# Patient Record
Sex: MALE | Race: WHITE | NOT HISPANIC OR LATINO
[De-identification: names, ages, dates, MRNs, and addresses within clinical notes are randomized per-mention and may not be internally consistent; named-entity substitution may affect disease eponyms.]

---

## 2022-04-11 ENCOUNTER — APPOINTMENT (OUTPATIENT)
Dept: ORTHOPEDIC SURGERY | Facility: AMBULATORY SURGERY CENTER | Age: 67
End: 2022-04-11
Payer: COMMERCIAL

## 2022-04-11 PROCEDURE — 64718 REVISE ULNAR NERVE AT ELBOW: CPT

## 2022-04-11 PROCEDURE — ZZZZZ: CPT | Mod: 1L

## 2022-04-11 PROCEDURE — 29105 APPLICATION LONG ARM SPLINT: CPT | Mod: LT

## 2022-04-13 ENCOUNTER — APPOINTMENT (OUTPATIENT)
Dept: ORTHOPEDIC SURGERY | Facility: CLINIC | Age: 67
End: 2022-04-13
Payer: COMMERCIAL

## 2022-04-13 VITALS — WEIGHT: 268 LBS | BODY MASS INDEX: 36.3 KG/M2 | HEIGHT: 72 IN

## 2022-04-13 DIAGNOSIS — Z78.9 OTHER SPECIFIED HEALTH STATUS: ICD-10-CM

## 2022-04-13 PROCEDURE — 99024 POSTOP FOLLOW-UP VISIT: CPT

## 2022-04-13 NOTE — PHYSICAL EXAM
[Left] : left elbow [] : light touch intact [FreeTextEntry3] : Left elbow incision c/d/i. No erythema/warmth/drainage. No ulceration noted. Surgical dressing intact. [FreeTextEntry9] : Not assessed due to recent sx. [de-identified] : Not assessed due to recent sx.

## 2022-04-13 NOTE — HISTORY OF PRESENT ILLNESS
[de-identified] : PATIENT IS S/P LEFT ELBOW SURGERY WITH DR. YOUNG 4/11/22.  PATIENT WAS TAKING A SHOWER 4/12/22. HE DOES NOT BELIEVE HE GOT HIS SPLINT WET, HOWEVER STATES HE FELT IT SHIFT AND NOW FEELS AS THOUGH THERE IS A PRESSURE ULCER FORMING ALONG HIS ELBOW. DENIES FEVERS/CHILLS. PAIN IS MINIMAL POST-OP.  [] : Patient is currently injured and not playing sports: no [FreeTextEntry1] : RT ELBOW [FreeTextEntry5] : PT HERE TO HAVE SPLINT REPLACED (BROKEN) AND WRAP REPLACED. [de-identified] : 4/11/22 [de-identified] : HECTOR [de-identified] : 4/11/22

## 2022-04-13 NOTE — ASSESSMENT
[FreeTextEntry1] : Patient presents today for wound check. He is s/p left elbow surgery with Dr. Fabian on 4/11/22. He states his splint was sliding and irritating his incision, patient thought he developed a pressure ulcer. I removed the ACE wrap/splint/webril to evaluate wound/elbow. Splint was moist and broken in half. Left elbow incision c/d/i. No erythema/warmth/drainage. No ulceration noted. Surgical dressing intact. I re-applied posterior mold and placed patient back into his sling. Will f/u as scheduled with Dr. Fabian. Advised him not to remove or get his splint wet. All questions were answered. Denies fevers/chills. Pain controlled.

## 2022-04-19 ENCOUNTER — APPOINTMENT (OUTPATIENT)
Dept: ORTHOPEDIC SURGERY | Facility: CLINIC | Age: 67
End: 2022-04-19
Payer: COMMERCIAL

## 2022-04-19 VITALS — BODY MASS INDEX: 36.3 KG/M2 | WEIGHT: 268 LBS | HEIGHT: 72 IN

## 2022-04-19 VITALS — HEIGHT: 72 IN | BODY MASS INDEX: 36.3 KG/M2 | WEIGHT: 268 LBS

## 2022-04-19 PROCEDURE — 99024 POSTOP FOLLOW-UP VISIT: CPT

## 2022-04-19 PROCEDURE — 20610 DRAIN/INJ JOINT/BURSA W/O US: CPT | Mod: LT,58

## 2022-04-19 NOTE — PROCEDURE
[Large Joint Injection] : Large joint injection [Left] : of the left [Shoulder] : shoulder [Pain] : pain [Inflammation] : inflammation [X-ray evidence of Osteoarthritis on this or prior visit] : x-ray evidence of Osteoarthritis on this or prior visit [Alcohol] : alcohol [Betadine] : betadine [Sterile technique used] : sterile technique used [Ethyl Chloride sprayed topically] : ethyl chloride sprayed topically [___ cc    3mg] :  Betamethasone (Celestone) ~Vcc of 3mg [___ cc    1%] : Lidocaine ~Vcc of 1%  [___ cc    0.25%] : Bupivacaine (Marcaine) ~Vcc of 0.25%  [] : Patient tolerated procedure well [Call if redness, pain or fever occur] : call if redness, pain or fever occur [Apply ice for 15min out of every hour for the next 12-24 hours as tolerated] : apply ice for 15 minutes out of every hour for the next 12-24 hours as tolerated [Patient was advised to rest the joint(s) for ____ days] : patient was advised to rest the joint(s) for [unfilled] days [Previous OTC use and PT nontherapeutic] : patient has tried OTC's including aspirin, Ibuprofen, Aleve, etc or prescription NSAIDS, and/or exercises at home and/or physical therapy without satisfactory response [Risks, benefits, alternatives discussed / Verbal consent obtained] : the risks benefits, and alternatives have been discussed, and verbal consent was obtained

## 2022-04-19 NOTE — HISTORY OF PRESENT ILLNESS
[Gradual] : gradual [9] : 9 [6] : 6 [Localized] : localized [Sharp] : sharp [Throbbing] : throbbing [de-identified] : 65yo M with left shoulder pain that has been worsening over the past few months with no injury. Recently had elbow surgery with Dr. Fabian on the L arm. He states it is bothersome when he has to lay down a certain way at night. He has done CSI of this shoulder in 11/2021 to much relief.  [] : no [FreeTextEntry1] : lt shoulder  [FreeTextEntry5] : pt states no injury has occurred, he states he saw Dr. Young today 4/19/22 for a follow up of the lt elbow, but he also has pain on his lt shoulder. pt forgot to mention to the doctor that he wanted a cortisone injection(HE HAS BEEN TREATED FOR THE SHOULDER WITH DR YOUNG IN THE PAST)  and the call center told the pt to come to [facility] for the injection  [FreeTextEntry9] : cortisone injection  [de-identified] : activity  [de-identified] : xray ocoa [de-identified] : oz

## 2022-05-31 ENCOUNTER — APPOINTMENT (OUTPATIENT)
Dept: ORTHOPEDIC SURGERY | Facility: CLINIC | Age: 67
End: 2022-05-31
Payer: COMMERCIAL

## 2022-05-31 PROCEDURE — 99024 POSTOP FOLLOW-UP VISIT: CPT

## 2022-05-31 NOTE — HISTORY OF PRESENT ILLNESS
[5] : 5 [Dull/Aching] : dull/aching [Tingling] : tingling [Constant] : constant [Full time] : Work status: full time [FreeTextEntry6] : TINGLING IN THE FINGERS [de-identified] : 4/13/22 [de-identified] : [employer] [4] : 4 [3] : 3 [] : Post Surgical Visit: yes [FreeTextEntry1] : left elbow [de-identified] : 4/11/22 [de-identified] : LEFT ULNAR NERVE DECOMPRESSION IN SITU

## 2022-05-31 NOTE — PHYSICAL EXAM
[Normal Coordination] : normal coordination [Normal DTR UE/LE] : normal DTR UE/LE  [Normal Sensation] : normal sensation [Normal Mood and Affect] : normal mood and affect [Orientated] : orientated [Normal Skin] : normal skin [No Rash] : no rash [No Ulcers] : no ulcers [No Lesions] : no lesions [No obvious lymphadenopathy in areas examined] : no obvious lymphadenopathy in areas examined [Left] : left elbow [] : no sero-sanguinous drainage

## 2022-05-31 NOTE — ASSESSMENT
[FreeTextEntry1] : PATIENT STATES HIS NUMBNESS AND PARASTHESIAS ARE IMPROVED SINCE PRE-SURGERY IN HIS RING AND SMALL FINGERS.

## 2022-05-31 NOTE — DISCUSSION/SUMMARY
[de-identified] : Assessment & Plan: The patient is approximately 2 weeks s/p [].Sutures removed and Steri Strips applied today. The patient is instructed in wound management. The patient's post-op plan, protocol and activity modifications have been thoroughly discussed and the patient expressed understanding. The patient will control pain as discussed & continue ice and elevation as needed. The patient otherwise may advance activity as discussed. Prescription Medications Ordered: [None] Physical Therapy: [Continue per protocol, new prescription given today, continue home exercise program] Braces/DME Ordered: [Continue Postop Brace] Activity/Work/Sports Status: [Out of work/gym/sports] Follow-Up: [4 weeks]

## 2022-05-31 NOTE — HISTORY OF PRESENT ILLNESS
[5] : 5 [Dull/Aching] : dull/aching [Tingling] : tingling [Constant] : constant [Full time] : Work status: full time [FreeTextEntry6] : TINGLING IN THE FINGERS [de-identified] : 4/13/22 [de-identified] : [employer] [4] : 4 [3] : 3 [] : Post Surgical Visit: yes [FreeTextEntry1] : left elbow [de-identified] : 4/11/22 [de-identified] : LEFT ULNAR NERVE DECOMPRESSION IN SITU

## 2022-05-31 NOTE — REVIEW OF SYSTEMS
[Joint Pain] : joint pain [Joint Stiffness] : joint stiffness [Negative] : Heme/Lymph [FreeTextEntry9] : LT ELBOW

## 2022-05-31 NOTE — DISCUSSION/SUMMARY
[de-identified] : Assessment & Plan: The patient is approximately 2 weeks s/p [].Sutures removed and Steri Strips applied today. The patient is instructed in wound management. The patient's post-op plan, protocol and activity modifications have been thoroughly discussed and the patient expressed understanding. The patient will control pain as discussed & continue ice and elevation as needed. The patient otherwise may advance activity as discussed. Prescription Medications Ordered: [None] Physical Therapy: [Continue per protocol, new prescription given today, continue home exercise program] Braces/DME Ordered: [Continue Postop Brace] Activity/Work/Sports Status: [Out of work/gym/sports] Follow-Up: [4 weeks]

## 2022-06-02 NOTE — DISCUSSION/SUMMARY
[de-identified] : Assessment & Plan: The patient is approximately 5 weeks s/p left ulnar nerve decompression in situ.\par \par The patient is instructed in wound management. The patient's post-op plan, protocol and activity modifications have been thoroughly discussed and the patient expressed understanding. The patient will control pain as discussed & continue ice and elevation as needed. The patient otherwise may advance activity as discussed.\par \par Prescription Medications Ordered: Diclofenac reordered at patient's request\par \par Physical Therapy: [Continue per protocol, new prescription given today, continue home exercise program]\par \par Braces/DME Ordered: [none needed]\par \par Activity/Work/Sports Status: [gradually return o full activity as tolerated.]\par \par Follow-Up: [6 weeks]

## 2022-06-02 NOTE — PHYSICAL EXAM
[Normal Coordination] : normal coordination [Normal DTR UE/LE] : normal DTR UE/LE  [Normal Sensation] : normal sensation [Normal Mood and Affect] : normal mood and affect [Orientated] : orientated [Normal Skin] : normal skin [No Rash] : no rash [No Ulcers] : no ulcers [No Lesions] : no lesions [No obvious lymphadenopathy in areas examined] : no obvious lymphadenopathy in areas examined [Left] : left elbow [NL (150)] : flexion 150 degrees [NL (0)] : extension 0 degrees [NL (90)] : supination 90 degrees [5___] : supination 5[unfilled]/5 [] : light touch intact

## 2022-06-02 NOTE — HISTORY OF PRESENT ILLNESS
[4] : 4 [3] : 3 [] : Post Surgical Visit: yes [de-identified] : 05/31/2022 \par \iron DANIEL is presenting today for followup. Pain and symptoms are improving significantly, he states. He visited his neurologist in the interim for general checkup, and states she was very pleased with his progress and nerve symptoms. He denies any numbness/tingling/fevers/chills.  [Left Arm] : left arm [de-identified] : 4/11/22 [de-identified] : LEFT ULNAR NERVE DECOMPRESSION IN SITU

## 2022-09-20 ENCOUNTER — APPOINTMENT (OUTPATIENT)
Dept: ORTHOPEDIC SURGERY | Facility: CLINIC | Age: 67
End: 2022-09-20

## 2022-09-20 VITALS — HEIGHT: 72 IN | WEIGHT: 268 LBS | BODY MASS INDEX: 36.3 KG/M2

## 2022-09-20 DIAGNOSIS — Z47.89 ENCOUNTER FOR OTHER ORTHOPEDIC AFTERCARE: ICD-10-CM

## 2022-09-20 PROCEDURE — J3490M: CUSTOM

## 2022-09-20 PROCEDURE — 99214 OFFICE O/P EST MOD 30 MIN: CPT | Mod: 25

## 2022-09-20 PROCEDURE — 20611 DRAIN/INJ JOINT/BURSA W/US: CPT | Mod: LT

## 2022-09-28 PROBLEM — Z47.89 AFTERCARE FOLLOWING SURGERY OF THE MUSCULOSKELETAL SYSTEM: Status: ACTIVE | Noted: 2022-04-11

## 2022-09-28 NOTE — HISTORY OF PRESENT ILLNESS
[de-identified] : The patient is a 66 year old LT hand dominant male who presents today complaining of left elbow/ manny knees pain. S/p ulnar nerve decompression left side.\par Pain:    At Rest: 5/60/10 \par With Activity:  8/10 \par Surgery Date: 4/11/2022\par This is NOT a Work Related Injury being treated under Worker's Compensation.\par This is NOT an athletic injury occurring associated with an interscholastic or organized sports team.\par Quality of symptoms: Aching/ Burning \par Improves with: CSI\par Worse with: Overuse \par Treatment/Imaging/Studies Since Last Visit:  none\par 	Reports Available For Review Today: none\par School/Sport/Position/Occupation Semi retired\par Change since last visit: \par Additional Information: None\par

## 2022-09-28 NOTE — PROCEDURE
[FreeTextEntry3] : Patient Identification \par Name/: Verbal with patient and/or family \par  \par Procedure Verification: \par Procedure confirmed with patient or family/designee \par Consent for procedure: Verbal Consent Given \par Relevant documentation completed, reviewed, and signed \par Clinical indications for procedure confirmed \par  \par Time-out with all members of procedure team immediately prior to procedure: \par Correct patient identified. Agreement on procedure. Correct side and site. \par  \par ULTRASOUND GUIDED KNEE INJECTION (STEROID) - B/L \par After verbal consent and identification of the correct patient and correct site, the B/L superolateral knees were prepped using alcohol swabs and betadine. This was allowed time to air dry. A mixture of 1cc Celestone 6mg/ml, 2cc Lidocaine 1%, and 2cc Bupivacaine 0.5% was injected into the suprapatellar pouch using a sterile 22G needle with US after ethyl chloride spray for skin anesthesia. The patient tolerated the procedure well. After-care instructions were provided and included instructions to ice the area and to call if redness, pain, or fever develop. Visualization of the needle and placement of the injection was performed without any complications. Ultrasound was used for visualization, precise injection in area of tear, and / or prior failure or difficult injection

## 2022-09-28 NOTE — DISCUSSION/SUMMARY
[de-identified] : The natural progression of osteoarthritis was explained to the patient.  We discussed the possible treatment options from conservative to operative.  These included NSAIDS, Glucosamine and Chondrotin sulfate, and Physical Therapy as well different types of injections.  We also discussed that at some point they may progress to needed a TKA.  Information and pamphlets were given.\par \par We discussed their diagnosis and treatment options at length including surgical and non-surgical options. We will first attempt conservative treatment with activity modification, PT, icing, weight loss, and anti-inflammatory medications. We discussed the possible of injections (steroid and viscosupplementation) in the future. The patient was provided with a PT prescription to work on ROM, hip ER/abductors strengthening, quad/hamstring stretches and strengthening, and other exercises on the Knee Arthritis Protocol.\par \par Dx / Natural History:\par The patient was advised of the diagnosis.  The natural history of the pathology was explained in full to the patient in layman's terms.  Several different treatment options were discussed and explained in full to the patient including the risks and benefits of both surgical and non-surgical treatments.  All questions and concerns were answered. \par \par Pain Guide Activities:\par The patient was advised to let pain guide the gradual advancement of activities.\par \par Physical Therapy:\par The patient was provided with a prescription for Physical Therapy.\par \par Icing:\par The patient was advised to apply ice (wrapped in a towel or protective covering) to the area daily (20 minutes at a time, 2-4X/day).\par \par Follow up in 4-6 weeks to re-evaluate.

## 2022-09-28 NOTE — PHYSICAL EXAM
[Normal Coordination] : normal coordination [Normal DTR UE/LE] : normal DTR UE/LE  [Normal Sensation] : normal sensation [Normal Mood and Affect] : normal mood and affect [Orientated] : orientated [Normal Skin] : normal skin [No Rash] : no rash [No Ulcers] : no ulcers [No Lesions] : no lesions [No obvious lymphadenopathy in areas examined] : no obvious lymphadenopathy in areas examined [Left] : left elbow [NL (150)] : flexion 150 degrees [NL (0)] : extension 0 degrees [NL (90)] : supination 90 degrees [Bilateral] : knee bilaterally [5___] : hamstring 5[unfilled]/5 [Positive] : positive Theresa [] : patient ambulates without assistive device

## 2023-03-20 ENCOUNTER — APPOINTMENT (OUTPATIENT)
Dept: ORTHOPEDIC SURGERY | Facility: CLINIC | Age: 68
End: 2023-03-20
Payer: COMMERCIAL

## 2023-03-20 PROCEDURE — 99214 OFFICE O/P EST MOD 30 MIN: CPT | Mod: 25

## 2023-03-20 PROCEDURE — 20610 DRAIN/INJ JOINT/BURSA W/O US: CPT | Mod: 50

## 2023-03-20 PROCEDURE — J3490M: CUSTOM

## 2023-03-28 ENCOUNTER — APPOINTMENT (OUTPATIENT)
Dept: ORTHOPEDIC SURGERY | Facility: CLINIC | Age: 68
End: 2023-03-28
Payer: COMMERCIAL

## 2023-03-28 VITALS — BODY MASS INDEX: 36.3 KG/M2 | WEIGHT: 268 LBS | HEIGHT: 72 IN

## 2023-03-28 DIAGNOSIS — R22.30 LOCALIZED SWELLING, MASS AND LUMP, UNSPECIFIED UPPER LIMB: ICD-10-CM

## 2023-03-28 PROCEDURE — 99213 OFFICE O/P EST LOW 20 MIN: CPT

## 2023-03-28 NOTE — ASSESSMENT
[FreeTextEntry1] : Left shoulder CSI tolerated well today\par Bilateral knee CSI tolerated well today

## 2023-03-28 NOTE — HISTORY OF PRESENT ILLNESS
[de-identified] : The patient is a 66 year old LT hand dominant male who presents today complaining of left elbow/shoulder & manny knee pain. His left elbow is improved he states, and he is happy with his progress postop. No numbness and tingling. \par Pain: At Rest:  varies/10 \par With Activity: 8/10 \par Quality of symptoms: Aching/ Burning \par Improves with: CSI\par Worse with: Overuse \par Treatment/Imaging/Studies Since Last Visit: None\par 	Reports Available For Review Today: none\par School/Sport/Position/Occupation Semi retired\par Change since last visit: \par Additional Information:  S/p ulnar nerve decompression left side DOS: 4/11/2022\par  regular rate and rhythm [place] Counseling:  I discussed with the patient the risks of golimumab including but not limited to myelosuppression, immunosuppression, autoimmune hepatitis, demyelinating diseases, lymphoma, and serious infections.  The patient understands that monitoring is required including a PPD at baseline and must alert us or the primary physician if symptoms of infection or other concerning signs are noted.

## 2023-03-28 NOTE — ASSESSMENT
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 Discussed the likely pathology but discussed we cannot be certain without excision. If he would like it excised, will obtain right ring finger MRI without contrast. Patient would like to observe at this time.\par \par F/u prn

## 2023-03-28 NOTE — HISTORY OF PRESENT ILLNESS
[de-identified] : 67M, LHD, No PMHx presents with mass on right hand ring finger and mass on right elbow. Reports having it present for a while. Admits to pain, lack of strength. Admits to having recent surgery on the left elbow for an ulnar nerve decompression (doing well). He reports previous doc did not want to treat the bump.

## 2023-03-28 NOTE — IMAGING
[de-identified] : Right ring finger with 3mm, nontender, firm mass at P1 volarly. No locking or catching. Able to fully flex and extend at MCP, PIP and DIP. Sensation intact throughout. <2sec cap refill. Right elbow mass difficult for patient to find.

## 2023-03-28 NOTE — PROCEDURE
[Bilateral] : bilaterally of the [Knee] : knee [X-ray evidence of Osteoarthritis on this or prior visit] : x-ray evidence of Osteoarthritis on this or prior visit [Apply ice for 15min out of every hour for the next 12-24 hours as tolerated] : apply ice for 15 minutes out of every hour for the next 12-24 hours as tolerated [Large Joint Injection] : Large joint injection [Left] : of the left [Subacromial Space] : subacromial space [Pain] : pain [Inflammation] : inflammation [Alcohol] : alcohol [Betadine] : betadine [Ethyl Chloride sprayed topically] : ethyl chloride sprayed topically [Sterile technique used] : sterile technique used [___ cc    6mg] :  Betamethasone (Celestone) ~Vcc of 6mg [___ cc    1%] : Lidocaine ~Vcc of 1%  [___ cc    0.25%] : Bupivacaine (Marcaine) ~Vcc of 0.25%  [] : Patient tolerated procedure well [Call if redness, pain or fever occur] : call if redness, pain or fever occur [Previous OTC use and PT nontherapeutic] : patient has tried OTC's including aspirin, Ibuprofen, Aleve, etc or prescription NSAIDS, and/or exercises at home and/or physical therapy without satisfactory response [Patient had decreased mobility in the joint] : patient had decreased mobility in the joint [Risks, benefits, alternatives discussed / Verbal consent obtained] : the risks benefits, and alternatives have been discussed, and verbal consent was obtained [Prior failure or difficult injection] : prior failure or difficult injection [All ultrasound images have been permanently captured and stored accordingly in our picture archiving and communication system] : All ultrasound images have been permanently captured and stored accordingly in our picture archiving and communication system [Visualization of the needle and placement of injection was performed without complication] : visualization of the needle and placement of injection was performed without complication

## 2023-03-28 NOTE — DISCUSSION/SUMMARY
[de-identified] : The natural progression of osteoarthritis was explained to the patient.  We discussed the possible treatment options from conservative to operative.  These included NSAIDS, Glucosamine and Chondrotin sulfate, and Physical Therapy as well different types of injections.  We also discussed that at some point they may progress to needed a TKA.  Information and pamphlets were given.\par \par We discussed their diagnosis and treatment options at length including surgical and non-surgical options. We will first attempt conservative treatment with activity modification, PT, icing, weight loss, and anti-inflammatory medications. We discussed the possible of injections (steroid and viscosupplementation) in the future. The patient was provided with a PT prescription to work on ROM, hip ER/abductors strengthening, quad/hamstring stretches and strengthening, and other exercises on the Knee Arthritis Protocol.\par \par Dx / Natural History:\par The patient was advised of the diagnosis.  The natural history of the pathology was explained in full to the patient in layman's terms.  Several different treatment options were discussed and explained in full to the patient including the risks and benefits of both surgical and non-surgical treatments.  All questions and concerns were answered. \par \par Pain Guide Activities:\par The patient was advised to let pain guide the gradual advancement of activities.\par \par Physical Therapy:\par The patient was provided with a prescription for Physical Therapy.\par \par Icing:\par The patient was advised to apply ice (wrapped in a towel or protective covering) to the area daily (20 minutes at a time, 2-4X/day).\par \par Follow up in 4-6 weeks to re-evaluate.

## 2023-04-21 ENCOUNTER — APPOINTMENT (OUTPATIENT)
Dept: ORTHOPEDIC SURGERY | Facility: CLINIC | Age: 68
End: 2023-04-21
Payer: COMMERCIAL

## 2023-04-21 PROCEDURE — 99214 OFFICE O/P EST MOD 30 MIN: CPT

## 2023-04-21 NOTE — IMAGING
[de-identified] : Right middle finger with mild swelling, multiple entry point from tooth, loosely sutured. Able to flex and extend at MCP, PIP and DIP. Sensation intact at radial/ulnar pulp. <2sec cap refill.  No erythema, no drainage.\par \par Right middle finger radiographs from [place] with collateral avulsion at PIP joint.\par \par \par Right ring finger with 3mm, nontender, firm mass at P1 volarly. No locking or catching. Able to fully flex and extend at MCP, PIP and DIP. Sensation intact throughout. <2sec cap refill. Right elbow mass difficult for patient to find.

## 2023-04-21 NOTE — HISTORY OF PRESENT ILLNESS
[de-identified] : 67M, LHD, No PMHx presents with mass on right hand ring finger and mass on right elbow. Reports having it present for a while. Admits to pain, lack of strength. Admits to having recent surgery on the left elbow for an ulnar nerve decompression (doing well). He reports previous doc did not want to treat the bump. \par \par ** NEW COMPLAINT **\par \par 4/21/23: c/o right hand dog bite from 4/19/23. Patient reports he was heading out to go to work, his neighbors mirlande puppies got out of the yard and escaped. He reports going to tell his neighbor the puppies were loose and where the area was blocked off so they couldn't get out - reports their adult dog which is a malamute sniffed his hand and then bit him. He reports going to [facility] - radiographs showed a fracture. does have sutures in his middle finger.  No constitutional symptoms.

## 2023-04-21 NOTE — ASSESSMENT
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 Discussed the likely pathology but discussed we cannot be certain without excision. If he would like it excised, will obtain right ring finger MRI without contrast. Patient would like to observe at this time.\par \par Right middle finger dog bite - reviewed radiographs and pathoanatomy with patient. Discussed distinct risk of infection in like of dog bite - will complete course of abx; peroxide soaks TID for 15 minutes. Keep area clean. ROM permitted. Discussed need to followup sooner should drainage or increased swelling ensue.\par \par F/u 1 week for suture removal and reassessment.

## 2023-05-01 ENCOUNTER — APPOINTMENT (OUTPATIENT)
Dept: ORTHOPEDIC SURGERY | Facility: CLINIC | Age: 68
End: 2023-05-01
Payer: COMMERCIAL

## 2023-05-01 PROCEDURE — 99213 OFFICE O/P EST LOW 20 MIN: CPT

## 2023-05-07 NOTE — HISTORY OF PRESENT ILLNESS
[de-identified] : 67M, LHD, No PMHx presents with mass on right hand ring finger and mass on right elbow. Reports having it present for a while. Admits to pain, lack of strength. Admits to having recent surgery on the left elbow for an ulnar nerve decompression (doing well). He reports previous doc did not want to treat the bump. \par \par ** NEW COMPLAINT **\par \par 4/21/23: c/o right hand dog bite from 4/19/23. Patient reports he was heading out to go to work, his neighbors mirlande puppies got out of the yard and escaped. He reports going to tell his neighbor the puppies were loose and where the area was blocked off so they couldn't get out - reports their adult dog which is a malamute sniffed his hand and then bit him. He reports going to [facility] - radiographs showed a fracture. does have sutures in his middle finger.  No constitutional symptoms.\par \par 5/1/23: f/u right hand. Reports still having pain, admits to numbness/tingling. Has finished abx. Suture no longer in place - removed by someone prior to this visit.

## 2023-05-07 NOTE — ASSESSMENT
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 Discussed the likely pathology but discussed we cannot be certain without excision. If he would like it excised, will obtain right ring finger MRI without contrast. Patient would like to observe at this time.\par \par Right middle finger dog bite - reviewed radiographs and pathoanatomy with patient. Discussed distinct risk of infection in like of dog bite - will complete course of abx; peroxide soaks TID for 15 minutes. Keep area clean. ROM permitted. Discussed need to followup sooner should drainage or increased swelling ensue. Can consider OT if stiff - patient would like to hold off.\par \par F/u 3 week reassessment.

## 2023-05-07 NOTE — IMAGING
[de-identified] : Right middle finger with mild swelling, multiple entry point from tooth, no sutures. Able to nearly fully flex and extend at MCP, PIP and DIP. Sensation intact at radial/ulnar pulp. <2sec cap refill.  No erythema, no drainage.\par \par Right middle finger radiographs from [place] with collateral avulsion at PIP joint.\par \par \par Right ring finger with 3mm, nontender, firm mass at P1 volarly. No locking or catching. Able to fully flex and extend at MCP, PIP and DIP. Sensation intact throughout. <2sec cap refill. Right elbow mass difficult for patient to find.

## 2023-05-24 ENCOUNTER — APPOINTMENT (OUTPATIENT)
Dept: ORTHOPEDIC SURGERY | Facility: CLINIC | Age: 68
End: 2023-05-24
Payer: COMMERCIAL

## 2023-05-24 DIAGNOSIS — S69.90XA UNSPECIFIED INJURY OF UNSPECIFIED WRIST, HAND AND FINGER(S), INITIAL ENCOUNTER: ICD-10-CM

## 2023-05-24 PROCEDURE — 99213 OFFICE O/P EST LOW 20 MIN: CPT

## 2023-05-26 PROBLEM — S69.90XA FINGER INJURY: Status: ACTIVE | Noted: 2023-05-07

## 2023-05-26 NOTE — IMAGING
[de-identified] : Right middle finger with mild swelling at PIPj, multiple entry point from tooth, no sutures. +ttp at MP and PIPj Able to fully flex and extend at MCP, PIP and DIP. Sensation intact at radial/ulnar pulp. <2sec cap refill.  No erythema, no drainage.\par \par Right middle finger radiographs from [place] with collateral avulsion at PIP joint.\par \par \par Right ring finger with 3mm, nontender, firm mass at P1 volarly. No locking or catching. Able to fully flex and extend at MCP, PIP and DIP. Sensation intact throughout. <2sec cap refill. Right elbow mass difficult for patient to find.

## 2023-05-26 NOTE — HISTORY OF PRESENT ILLNESS
[de-identified] : 67M, LHD, No PMHx presents with mass on right hand ring finger and mass on right elbow. Reports having it present for a while. Admits to pain, lack of strength. Admits to having recent surgery on the left elbow for an ulnar nerve decompression (doing well). He reports previous doc did not want to treat the bump. \par \par ** NEW COMPLAINT **\par \par 4/21/23: c/o right hand dog bite from 4/19/23. Patient reports he was heading out to go to work, his neighbors mirlande puppies got out of the yard and escaped. He reports going to tell his neighbor the puppies were loose and where the area was blocked off so they couldn't get out - reports their adult dog which is a malamute sniffed his hand and then bit him. He reports going to [facility] - radiographs showed a fracture. does have sutures in his middle finger.  No constitutional symptoms.\par \par 5/1/23: f/u right hand. Reports still having pain, admits to numbness/tingling. Has finished abx. Suture no longer in place - removed by someone prior to this visit.\par \par 5/24/23: f/u right hand. Still has pain, denies numbness/tingling.

## 2023-05-26 NOTE — ASSESSMENT
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 Discussed the likely pathology but discussed we cannot be certain without excision. If he would like it excised, will obtain right ring finger MRI without contrast. Patient would like to observe at this time.\par \par Right middle finger dog bite - reviewed radiographs and pathoanatomy with patient. Wounds have healed, full motion but with pain and tightness - will pursue OT.\par \par F/u prn

## 2023-07-07 ENCOUNTER — APPOINTMENT (OUTPATIENT)
Dept: ORTHOPEDIC SURGERY | Facility: CLINIC | Age: 68
End: 2023-07-07
Payer: MEDICARE

## 2023-07-07 PROCEDURE — 99214 OFFICE O/P EST MOD 30 MIN: CPT | Mod: 25

## 2023-07-07 PROCEDURE — 20550 NJX 1 TENDON SHEATH/LIGAMENT: CPT | Mod: RT

## 2023-07-07 NOTE — HISTORY OF PRESENT ILLNESS
[de-identified] : 67M, LHD, No PMHx presents with mass on right hand ring finger and mass on right elbow. Reports having it present for a while. Admits to pain, lack of strength. Admits to having recent surgery on the left elbow for an ulnar nerve decompression (doing well). He reports previous doc did not want to treat the bump. \par \par ** NEW COMPLAINT **\par \par 4/21/23: c/o right hand dog bite from 4/19/23. Patient reports he was heading out to go to work, his neighbors mirlande puppies got out of the yard and escaped. He reports going to tell his neighbor the puppies were loose and where the area was blocked off so they couldn't get out - reports their adult dog which is a malamute sniffed his hand and then bit him. He reports going to [facility] - radiographs showed a fracture. does have sutures in his middle finger.  No constitutional symptoms.\par \par 5/1/23: f/u right hand. Reports still having pain, admits to numbness/tingling. Has finished abx. Suture no longer in place - removed by someone prior to this visit.\par \par 5/24/23: f/u right hand. Still has pain, denies numbness/tingling. \par \par 7/7/23: F/u RT hand. Patient reports pain and difficulty bending his 4th and 5th fingers that is worsening since his last visit.

## 2023-07-07 NOTE — IMAGING
[de-identified] : Right middle finger with mild swelling at PIPj, multiple entry point from tooth, no sutures. +ttp at MP and PIPj Able to fully flex and extend at MCP, PIP and DIP. Sensation intact at radial/ulnar pulp. <2sec cap refill. No erythema, no drainage.\par \par Right middle finger radiographs from [place] with collateral avulsion at PIP joint.\par \par \par Right ring finger with 3mm, nontender, firm mass at P1 volarly. +ttp at A1 pulley. No locking or catching. Able to fully flex and extend at MCP, PIP and DIP. Sensation intact throughout. <2sec cap refill. Right elbow mass difficult for patient to find.

## 2023-07-07 NOTE — ASSESSMENT
[FreeTextEntry1] : Right ring finger trigger/flexor tendosynovitis - Discussed with patient the pathoanatomy of trigger and options going forward. Risks/benefits discussed as well as natural progression that injection will provide some relief but symptoms may recur. Discussed that pain may worsen in coming days but will subside. Discussed that we can repeat an injection or that operative intervention may be indicated down the line.\par ***Procedure 1 - 0.5cc 1%lidocaine + 0.5cc 40mg/cc Kenalog administered to right ring finger A1 pulley following sterilization with Betadine. Patient tolerated this well.\par \par Right ring finger retinacular cyst - reviewed pathoanatomy with patient. Discussed the likely pathology but discussed we cannot be certain without excision. If he would like it excised, will obtain right ring finger MRI without contrast. Patient would like to observe at this time.\par \par Right middle finger dog bite - reviewed radiographs and pathoanatomy with patient. Wounds have healed, full motion but with pain and tightness - will pursue OT.\par \par F/u prn.

## 2023-08-07 ENCOUNTER — APPOINTMENT (OUTPATIENT)
Dept: ORTHOPEDIC SURGERY | Facility: CLINIC | Age: 68
End: 2023-08-07
Payer: MEDICARE

## 2023-08-07 PROCEDURE — 99214 OFFICE O/P EST MOD 30 MIN: CPT | Mod: 25

## 2023-08-07 PROCEDURE — 20611 DRAIN/INJ JOINT/BURSA W/US: CPT | Mod: 50,59

## 2023-08-07 PROCEDURE — J3490M: CUSTOM | Mod: NC

## 2023-08-07 NOTE — PHYSICAL EXAM
[Normal Coordination] : normal coordination [Normal DTR UE/LE] : normal DTR UE/LE  [Normal Sensation] : normal sensation [Normal Mood and Affect] : normal mood and affect [Orientated] : orientated [Normal Skin] : normal skin [No Rash] : no rash [No Ulcers] : no ulcers [No Lesions] : no lesions [No obvious lymphadenopathy in areas examined] : no obvious lymphadenopathy in areas examined [Bilateral] : knee bilaterally [5___] : hamstring 5[unfilled]/5 [Positive] : positive Theresa [] : patient ambulates without assistive device

## 2023-08-07 NOTE — PROCEDURE
[Knee] : knee [X-ray evidence of Osteoarthritis on this or prior visit] : x-ray evidence of Osteoarthritis on this or prior visit [Apply ice for 15min out of every hour for the next 12-24 hours as tolerated] : apply ice for 15 minutes out of every hour for the next 12-24 hours as tolerated [Large Joint Injection] : Large joint injection [Subacromial Space] : subacromial space [Pain] : pain [Inflammation] : inflammation [Alcohol] : alcohol [Betadine] : betadine [Ethyl Chloride sprayed topically] : ethyl chloride sprayed topically [Sterile technique used] : sterile technique used [___ cc    6mg] :  Betamethasone (Celestone) ~Vcc of 6mg [___ cc    1%] : Lidocaine ~Vcc of 1%  [___ cc    0.25%] : Bupivacaine (Marcaine) ~Vcc of 0.25%  [] : Patient tolerated procedure well [Call if redness, pain or fever occur] : call if redness, pain or fever occur [Previous OTC use and PT nontherapeutic] : patient has tried OTC's including aspirin, Ibuprofen, Aleve, etc or prescription NSAIDS, and/or exercises at home and/or physical therapy without satisfactory response [Patient had decreased mobility in the joint] : patient had decreased mobility in the joint [Risks, benefits, alternatives discussed / Verbal consent obtained] : the risks benefits, and alternatives have been discussed, and verbal consent was obtained [Prior failure or difficult injection] : prior failure or difficult injection [All ultrasound images have been permanently captured and stored accordingly in our picture archiving and communication system] : All ultrasound images have been permanently captured and stored accordingly in our picture archiving and communication system [Visualization of the needle and placement of injection was performed without complication] : visualization of the needle and placement of injection was performed without complication [Bilateral] : bilaterally of the

## 2023-08-11 NOTE — HISTORY OF PRESENT ILLNESS
[de-identified] : The patient is a 66 year old LT hand dominant male who presents today complaining of bilateral shoulder & manny knee pain. His left elbow is improved he states, and he is happy with his progress postop. No numbness and tingling. CSIs from last visit had provided a few weeks of relief  Pain: At Rest: varies/10 With Activity: 8/10 Quality of symptoms: Aching/ Burning Improves with: CSI Worse with: Overuse Treatment/Imaging/Studies Since Last Visit: None  Reports Available For Review Today: none School/Sport/Position/Occupation Semi retired Change since last visit: Additional Information: S/p ulnar nerve decompression left side DOS: 4/11/2022

## 2023-08-11 NOTE — DISCUSSION/SUMMARY
[de-identified] : The natural progression of osteoarthritis was explained to the patient.  We discussed the possible treatment options from conservative to operative.  These included NSAIDS, Glucosamine and Chondrotin sulfate, and Physical Therapy as well different types of injections.  We also discussed that at some point they may progress to needed a TKA.  Information and pamphlets were given.  We discussed their diagnosis and treatment options at length including surgical and non-surgical options. We will first attempt conservative treatment with activity modification, PT, icing, weight loss, and anti-inflammatory medications. We discussed the possible of injections (steroid and viscosupplementation) in the future. The patient was provided with a PT prescription to work on ROM, hip ER/abductors strengthening, quad/hamstring stretches and strengthening, and other exercises on the Knee Arthritis Protocol.  Dx / Natural History: The patient was advised of the diagnosis.  The natural history of the pathology was explained in full to the patient in layman's terms.  Several different treatment options were discussed and explained in full to the patient including the risks and benefits of both surgical and non-surgical treatments.  All questions and concerns were answered.   Pain Guide Activities: The patient was advised to let pain guide the gradual advancement of activities.  Physical Therapy: The patient was provided with a prescription for Physical Therapy.  Icing: The patient was advised to apply ice (wrapped in a towel or protective covering) to the area daily (20 minutes at a time, 2-4X/day).  f/up prn

## 2023-11-06 RX ORDER — DICLOFENAC SODIUM 75 MG/1
75 TABLET, DELAYED RELEASE ORAL TWICE DAILY
Qty: 60 | Refills: 0 | Status: COMPLETED | COMMUNITY
Start: 2023-03-14 | End: 2023-12-06

## 2023-11-13 ENCOUNTER — APPOINTMENT (OUTPATIENT)
Dept: ORTHOPEDIC SURGERY | Facility: CLINIC | Age: 68
End: 2023-11-13
Payer: MEDICARE

## 2023-11-13 DIAGNOSIS — Z00.00 ENCOUNTER FOR GENERAL ADULT MEDICAL EXAMINATION W/OUT ABNORMAL FINDINGS: ICD-10-CM

## 2023-11-13 DIAGNOSIS — M19.011 PRIMARY OSTEOARTHRITIS, RIGHT SHOULDER: ICD-10-CM

## 2023-11-13 DIAGNOSIS — M65.30 TRIGGER FINGER, UNSPECIFIED FINGER: ICD-10-CM
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
## 2023-11-13 PROCEDURE — 20611 DRAIN/INJ JOINT/BURSA W/US: CPT | Mod: 50

## 2023-11-13 PROCEDURE — 99214 OFFICE O/P EST MOD 30 MIN: CPT | Mod: 25

## 2023-11-13 PROCEDURE — 73120 X-RAY EXAM OF HAND: CPT | Mod: RT

## 2023-11-13 PROCEDURE — J3490M: CUSTOM | Mod: NC

## 2023-11-14 ENCOUNTER — RESULT REVIEW (OUTPATIENT)
Age: 68
End: 2023-11-14

## 2023-11-20 ENCOUNTER — APPOINTMENT (OUTPATIENT)
Dept: ORTHOPEDIC SURGERY | Facility: CLINIC | Age: 68
End: 2023-11-20
Payer: MEDICARE

## 2023-11-20 PROCEDURE — 99443: CPT | Mod: 95
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
## 2023-11-24 PROBLEM — Z00.00 ENCOUNTER FOR PREVENTIVE HEALTH EXAMINATION: Status: ACTIVE | Noted: 2022-04-11

## 2024-03-14 RX ORDER — DICLOFENAC SODIUM 75 MG/1
75 TABLET, DELAYED RELEASE ORAL TWICE DAILY
Qty: 60 | Refills: 0 | Status: ACTIVE | COMMUNITY
Start: 2022-05-31 | End: 1900-01-01

## 2024-03-25 ENCOUNTER — APPOINTMENT (OUTPATIENT)
Dept: RHEUMATOLOGY | Facility: CLINIC | Age: 69
End: 2024-03-25
Payer: MEDICARE

## 2024-03-25 VITALS
WEIGHT: 270 LBS | HEART RATE: 69 BPM | DIASTOLIC BLOOD PRESSURE: 87 MMHG | OXYGEN SATURATION: 96 % | SYSTOLIC BLOOD PRESSURE: 139 MMHG | HEIGHT: 72 IN | BODY MASS INDEX: 36.57 KG/M2

## 2024-03-25 DIAGNOSIS — M19.049 PRIMARY OSTEOARTHRITIS, UNSPECIFIED HAND: ICD-10-CM

## 2024-03-25 DIAGNOSIS — Z98.890 OTHER SPECIFIED POSTPROCEDURAL STATES: ICD-10-CM

## 2024-03-25 PROCEDURE — 99204 OFFICE O/P NEW MOD 45 MIN: CPT

## 2024-03-25 NOTE — ASSESSMENT
[FreeTextEntry1] : 67 y/o male referred to rheumatology for joint pains.  Pt reports pains diffusely including b/l hands, feet, ankles. Reports occasional swelling of 1st CMC area. Pt is s/p L cubital tunnel release in 2023. Pt reports cramps of b/l legs especially at night. Patient has been following with ortho for polyarthralgia. Pt is s/p L ulnar nerve decompression. Pt has been told he may need TKA in the future.  Pt receives b/l shoulder injections by ortho.  Pt has multiple joint and muscle pains without clear inflammatory characteristics. Although evaluation for an underlying autoimmune inflammatory arthritis can be done due to significant polyarthralgia, workup so far by other doctors have showing mechanical causes of pt's individual pains including OA, bursitis, cubital tunnel syndrome. I do not have high suspicion for underlying systemic autoimmune inflammatory arthritis.  - Obtain labwork to evaluate for signs of inflammatory arthritis. - Continue follow up with PCP, ortho, neuro for continuing evaluation, treatment of individual mechanical or neuropathic pains, Consider follow up with pain management if overall pain management becomes more complex. - Will contact pt with results of above workup. If unremarkable, pt does not need to follow up regularly with rheumatology. RTC PRN.

## 2024-03-25 NOTE — PHYSICAL EXAM
[TextEntry] : GENERAL: Appears in no acute distress  HEENT: EOMI, PERRLA. No conjunctival erythema. Moist mucous membranes. No nasopharyngeal ulcers  NECK: Supple, no cervical lymphadenopathy, no thyromegaly  CARDIOVASCULAR: RRR. S1, S2 auscultated. No murmurs or rubs.  PULMONARY: Clear to auscultation b/l, no wheezes, rales, or crackles  ABDOMINAL: Soft, nontender, nondistended. Bowel sounds present. No organomegaly.  MSK:  No active synovitis, swelling, erythema, or warmth.  No deformities.  SKIN: No lesions or rashes  NEURO: No focal deficits. PSYCH: AAOx3. Normal affect and thought process.

## 2024-03-25 NOTE — HISTORY OF PRESENT ILLNESS
[FreeTextEntry1] : 69 y/o male referred to rheumatology for joint pains.  Pt reports pains diffusely including b/l hands, feet, ankles. Reports occasional swelling of 1st CMC area. Pt is s/p L cubital tunnel release in 2023. Pt reports cramps of b/l legs especially at night. Patient has been following with ortho for polyarthralgia. Pt is s/p L ulnar nerve decompression. Pt has been told he may need TKA in the future.  Pt receives b/l shoulder injections by ortho.  WORKUP:  XR L ankle (1/2024): Degenerative changes. MR R hand (11/2023, in setting of dog bite): Scattered OA changes without synovitis.  EMG/NCV b/l UE (2/2024): Moderate b/l cubital tunnel syndrome

## 2024-04-10 ENCOUNTER — APPOINTMENT (OUTPATIENT)
Dept: ORTHOPEDIC SURGERY | Facility: CLINIC | Age: 69
End: 2024-04-10
Payer: MEDICARE

## 2024-04-10 DIAGNOSIS — M76.822 POSTERIOR TIBIAL TENDINITIS, RIGHT LEG: ICD-10-CM

## 2024-04-10 DIAGNOSIS — M76.821 POSTERIOR TIBIAL TENDINITIS, RIGHT LEG: ICD-10-CM

## 2024-04-10 PROCEDURE — 20610 DRAIN/INJ JOINT/BURSA W/O US: CPT | Mod: 50

## 2024-04-10 PROCEDURE — 99214 OFFICE O/P EST MOD 30 MIN: CPT | Mod: 25

## 2024-04-10 PROCEDURE — J3490M: CUSTOM | Mod: NC

## 2024-04-10 NOTE — PROCEDURE
[Knee] : knee [X-ray evidence of Osteoarthritis on this or prior visit] : x-ray evidence of Osteoarthritis on this or prior visit [Apply ice for 15min out of every hour for the next 12-24 hours as tolerated] : apply ice for 15 minutes out of every hour for the next 12-24 hours as tolerated [Large Joint Injection] : Large joint injection [Subacromial Space] : subacromial space [Pain] : pain [Inflammation] : inflammation [Alcohol] : alcohol [Betadine] : betadine [Ethyl Chloride sprayed topically] : ethyl chloride sprayed topically [Sterile technique used] : sterile technique used [___ cc    6mg] :  Betamethasone (Celestone) ~Vcc of 6mg [___ cc    1%] : Lidocaine ~Vcc of 1%  [___ cc    0.25%] : Bupivacaine (Marcaine) ~Vcc of 0.25%  [Call if redness, pain or fever occur] : call if redness, pain or fever occur [] : Patient tolerated procedure well [Previous OTC use and PT nontherapeutic] : patient has tried OTC's including aspirin, Ibuprofen, Aleve, etc or prescription NSAIDS, and/or exercises at home and/or physical therapy without satisfactory response [Patient had decreased mobility in the joint] : patient had decreased mobility in the joint [Risks, benefits, alternatives discussed / Verbal consent obtained] : the risks benefits, and alternatives have been discussed, and verbal consent was obtained [Prior failure or difficult injection] : prior failure or difficult injection [Right] : of the right [Left] : of the left

## 2024-04-10 NOTE — IMAGING
[de-identified] : The patient is a well appearing 68 year year old male of their stated age. Neck is supple & nontender to palpation. Negative Spurling's test.  Effected Shoulder LEFT Inspection: Scapula Winging: Negative Deformity: None Erythema: None Ecchymosis: None Abrasions: None Effusion: Mild Crepitus: Moderate  Range of Motion: Active Forward Flexion:110 degrees  Passive Forward Flexion:130 degrees  Active IR : back pocket Active ER :30 degrees  Motor Exam: Forward Flexion: 4+ out of 5 Flexion Plane of Scapula: 5 out of 5 Abduction: 4+ out of 5 Internal Rotation: 5 out of 5 External Rotation: 4+ out of 5 Distal Motor Strength: 5 out of 5  Stability Testing: Anterior: 1+ Posterior: 1+ Sulcus N: 1+ Sulcus ER: 1+  Provocative Tests: Drop Arm: Negative Rothman/Impingement: Positive [place]: Positive X-Arm Adduction: Negative Belly Press: Negative Bear Hug: Negative Lift Off: Negative Apprehension: Negative Relocation: Negative Posterior Load & Shift: Negative  Palpation: AC Joint: Nontender Clavicle: Nontender SC Joint: Nontender Bicepital Groove: Positive Coracoid Process: Positive Pectoralis Minor Tendon: Nontender Pectoralis Major Tendon: Nontender & palpably intact Latissimus Dorsi: Nontender  Proximal Humerus: Positive Scapula Body: Nontender Medial Scapula Boarder: Nontender Scapula Spine: Nontender  Neurologic Exam: Sensation to Light Touch: Axillary: Grossly intact Ulnar: Grossly intact Radial: Grossly intact Median: Grossly intact Other:  N/A  Circulatory/Pulses: Ulnar: 2+ Radial: 2+ Other Pertinent Findings: None  Contralateral Shoulder Range of Motion: Active Forward Flexion: 180 degrees  Active Abduction: 180 degrees  Passive Forward Flexion: 180 degrees  Passive Abduction: 180 degrees  ER @ 90 degrees: 90 degrees IR @ 90 degrees: 45 degrees ER @ 0 degrees: 50 degrees  Motor Exam: Forward Flexion: 5 out of 5 Flexion Plane of Scapula: 5 out of 5 Abduction: 5 out of 5 Internal Rotation: 5 out of 5 External Rotation: 5 out of 5 Distal Motor Strength: 5 out of 5  Stability Testing: Anterior: 1+ Posterior: 1+ Sulcus N: 1+ Sulcus ER: 1+  Other Pertinent Findings: None

## 2024-04-12 ENCOUNTER — RESULT REVIEW (OUTPATIENT)
Age: 69
End: 2024-04-12

## 2024-04-15 NOTE — HISTORY OF PRESENT ILLNESS
[de-identified] : 04/10/24: Follow up for bilateral ankles. Pt. pain levels are currently 10/10. Quality of symptoms would be sharp burning pain. Reports surgical treatment in the past in left ankle. Pain located to the medial and lateral aspect of ankle. Patient also requesting csi in left shoulder and left knee.

## 2024-04-15 NOTE — PHYSICAL EXAM
[Normal Coordination] : normal coordination [Normal DTR UE/LE] : normal DTR UE/LE  [Normal Sensation] : normal sensation [Normal Mood and Affect] : normal mood and affect [Oriented] : oriented [Normal Skin] : normal skin [No Rash] : no rash [No Ulcers] : no ulcers [No Lesions] : no lesions [No obvious lymphadenopathy in areas examined] : no obvious lymphadenopathy in areas examined [Right] : right hand [There are no fractures, subluxations or dislocations. No significant abnormalities are seen] : There are no fractures, subluxations or dislocations. No significant abnormalities are seen [Bilateral] : knee bilaterally [5___] : hamstring 5[unfilled]/5 [Positive] : positive Theresa [Left] : left foot and ankle [] : no swelling

## 2024-04-25 ENCOUNTER — APPOINTMENT (OUTPATIENT)
Dept: ORTHOPEDIC SURGERY | Facility: CLINIC | Age: 69
End: 2024-04-25
Payer: MEDICARE

## 2024-04-25 VITALS — HEIGHT: 72 IN | WEIGHT: 270 LBS | BODY MASS INDEX: 36.57 KG/M2

## 2024-04-25 DIAGNOSIS — S86.312A STRAIN OF MUSCLE(S) AND TENDON(S) OF PERONEAL MUSCLE GROUP AT LOWER LEG LEVEL, LEFT LEG, INITIAL ENCOUNTER: ICD-10-CM

## 2024-04-25 DIAGNOSIS — E78.00 PURE HYPERCHOLESTEROLEMIA, UNSPECIFIED: ICD-10-CM

## 2024-04-25 DIAGNOSIS — S86.311A STRAIN OF MUSCLE(S) AND TENDON(S) OF PERONEAL MUSCLE GROUP AT LOWER LEG LEVEL, RIGHT LEG, INITIAL ENCOUNTER: ICD-10-CM

## 2024-04-25 DIAGNOSIS — I10 ESSENTIAL (PRIMARY) HYPERTENSION: ICD-10-CM

## 2024-04-25 PROCEDURE — 99214 OFFICE O/P EST MOD 30 MIN: CPT

## 2024-04-25 RX ORDER — SIMVASTATIN 80 MG/1
TABLET, FILM COATED ORAL
Refills: 0 | Status: ACTIVE | COMMUNITY

## 2024-04-25 RX ORDER — RAMIPRIL 5 MG/1
CAPSULE ORAL
Refills: 0 | Status: ACTIVE | COMMUNITY

## 2024-05-01 NOTE — PHYSICAL EXAM
[Left] : left foot and ankle [Right] : right foot and ankle [NL (40)] : plantar flexion 40 degrees [NL 30)] : inversion 30 degrees [5___] : inversion 5[unfilled]/5 [2+] : posterior tibialis pulse: 2+ [Normal] : saphenous nerve sensation normal [4___] : plantar flexion 4[unfilled]/5 [3___] : eversion 3[unfilled]/5 [] : no pain when stressing lateral tarsal metatarsal joint [de-identified] : Venous stasis changes [de-identified] : eversion 15 degrees [TWNoteComboBox7] : dorsiflexion 5 degrees

## 2024-05-01 NOTE — DISCUSSION/SUMMARY
[de-identified] : MRI reports and films reviewed with patient.  Discussed treatment options, both operative and non-operative. Surgically, he was discussed peroneal debridement and repair with possible allograft vs. tenodesis.   The risks and benefits of surgery have been discussed. Risks include but are not limited to bleeding, infection, reaction to anesthesia, injury to blood vessels and nerves, malunion, nonunion, necessity of repeat surgery, chronic pain, DVT, PE, loss of limb and death. The patient understands the risks and agrees with the surgical plan. Details of the procedure and postoperative protocol were discussed at length. All questions have been answered.  WB in supportive footwear with bracing was discussed.   Discussed custom fabricated hinged AFO with lining and varus/valgus control.  The patient is ambulatory and requires stabilization at the ankle to continue safe ambulation. The patient requires a custom brace because they cannot fit into a prefabricated brace due to the limb size and shape. Also, the condition necessitating the orthosis is expected to be of longstanding duration. There is also a need to control the ankle in multiple planes.  He would like to think about his options.

## 2024-05-01 NOTE — HISTORY OF PRESENT ILLNESS
[Gradual] : gradual [10] : 10 [7] : 7 [Radiating] : radiating [Intermittent] : intermittent [Nothing helps with pain getting better] : Nothing helps with pain getting better [de-identified] : Pt. is a 68 year old male who presents for evaluation of his ankles. Denies trauma. Symptoms x years, getting progressively worse. WB without assistive device. No formal treatment to date. History of LT ankle fracture at age 17 while skiing.  [] : no [FreeTextEntry1] : both ankles [FreeTextEntry5] : ankle pain for awhile, no recent injury  [FreeTextEntry7] : up to knee [FreeTextEntry9] : tylenol at night [de-identified] : getting up  and some locking  [de-identified] : Dr. Fabian [de-identified] : MRI [facility]

## 2024-05-01 NOTE — DATA REVIEWED
[MRI] : MRI [Right] : of the right [Ankle] : ankle [FreeTextEntry1] : [facility] 4/12/24: 1. Complete tear of the peroneus brevis tendon at the level of the lateral malleolus with a gap of 3 cm distally. 2. Tenosynovitis in the peroneus longus tendon. 3. Moderate subtalar joint effusion.  [FreeTextEntry2] : [facility] 4/12/24: Complete tear of the peroneus brevis tendon at the level of the lateral malleolus with retraction of the torn fibers. Mild tendinosis and tenosynovitis of the peroneus longus tendon.  Scarring of the anterior talofibular ligament, which inserts on a nonacute avulsion fracture of the distal fibula which has not healed. Scarring of the calcaneofibular and deltoid ligaments.  Soft tissue edema around the ankle.

## 2024-06-24 ENCOUNTER — APPOINTMENT (OUTPATIENT)
Dept: ORTHOPEDIC SURGERY | Facility: CLINIC | Age: 69
End: 2024-06-24

## 2024-06-24 DIAGNOSIS — M19.012 PRIMARY OSTEOARTHRITIS, LEFT SHOULDER: ICD-10-CM

## 2024-06-24 DIAGNOSIS — M25.50 PAIN IN UNSPECIFIED JOINT: ICD-10-CM

## 2024-06-24 DIAGNOSIS — M17.0 BILATERAL PRIMARY OSTEOARTHRITIS OF KNEE: ICD-10-CM

## 2024-06-24 PROCEDURE — J3490M: CUSTOM | Mod: NC

## 2024-06-24 PROCEDURE — 20611 DRAIN/INJ JOINT/BURSA W/US: CPT | Mod: LT

## 2024-06-24 PROCEDURE — 99214 OFFICE O/P EST MOD 30 MIN: CPT | Mod: 25

## 2024-06-24 PROCEDURE — 20610 DRAIN/INJ JOINT/BURSA W/O US: CPT | Mod: 59,LT

## 2024-06-24 RX ORDER — DICLOFENAC SODIUM 75 MG/1
75 TABLET, DELAYED RELEASE ORAL
Qty: 60 | Refills: 0 | Status: ACTIVE | COMMUNITY
Start: 2024-06-24 | End: 1900-01-01

## 2024-07-02 PROBLEM — M25.50 POLYARTHRALGIA: Status: ACTIVE | Noted: 2024-03-25

## 2024-07-25 ENCOUNTER — APPOINTMENT (OUTPATIENT)
Dept: ORTHOPEDIC SURGERY | Facility: CLINIC | Age: 69
End: 2024-07-25
Payer: MEDICARE

## 2024-07-25 VITALS — WEIGHT: 270 LBS | HEIGHT: 72 IN | BODY MASS INDEX: 36.57 KG/M2

## 2024-07-25 DIAGNOSIS — S86.311D STRAIN OF MUSCLE(S) AND TENDON(S) OF PERONEAL MUSCLE GROUP AT LOWER LEG LEVEL, RIGHT LEG, SUBSEQUENT ENCOUNTER: ICD-10-CM

## 2024-07-25 DIAGNOSIS — S86.312D STRAIN OF MUSCLE(S) AND TENDON(S) OF PERONEAL MUSCLE GROUP AT LOWER LEG LEVEL, LEFT LEG, SUBSEQUENT ENCOUNTER: ICD-10-CM

## 2024-07-25 PROCEDURE — 99214 OFFICE O/P EST MOD 30 MIN: CPT

## 2024-07-25 NOTE — DISCUSSION/SUMMARY
[de-identified] : Patient was explained that he is likely not going to improve significantly over time. Possible inversion contractures in the future were discussed. We again discussed surgery (peroneal tenodesis with possible allograft repair), but he is not interested in surgery at this time. He will continue with the Anay braces. Icing/nsaids prn. He will return if he wishes to pursue further treatment.

## 2024-07-25 NOTE — HISTORY OF PRESENT ILLNESS
[Gradual] : gradual [Intermittent] : intermittent [Nothing helps with pain getting better] : Nothing helps with pain getting better [10] : 10 [3] : 3 [Localized] : localized [Walking] : walking [de-identified] : Patient returns for his bilateral ankle pain.  Prior MRI showed bilateral peroneal brevis tendon ruptures.  He has been wearing bilateral Dennis braces since early May, 2024.  He reports some improvement, but still gets intermittent pain. He was offered surgery in the past but he declined.  He takes diclofenac prn. [] : no [FreeTextEntry1] : both ankles [FreeTextEntry5] : ankle pain for awhile, no recent injury  [FreeTextEntry9] : tylenol at night, braces

## 2024-07-25 NOTE — REVIEW OF SYSTEMS
[Joint Pain] : joint pain [Muscle Weakness] : muscle weakness [Negative] : Heme/Lymph [Joint Swelling] : joint swelling [FreeTextEntry9] : left ankle

## 2024-07-25 NOTE — PHYSICAL EXAM
[Left] : left foot and ankle [Right] : right foot and ankle [NL (40)] : plantar flexion 40 degrees [NL 30)] : inversion 30 degrees [2+] : posterior tibialis pulse: 2+ [Normal] : saphenous nerve sensation normal [5___] : plantar flexion 5[unfilled]/5 [4___] : eversion 4[unfilled]/5 [] : no pain when stressing lateral tarsal metatarsal joint [de-identified] : Venous stasis changes [de-identified] : WB in [place] trujillo [de-identified] : eversion 15 degrees [TWNoteComboBox7] : dorsiflexion 0 degrees

## 2025-02-12 ENCOUNTER — APPOINTMENT (OUTPATIENT)
Dept: ORTHOPEDIC SURGERY | Facility: CLINIC | Age: 70
End: 2025-02-12
Payer: MEDICARE

## 2025-02-12 VITALS — HEIGHT: 72 IN | BODY MASS INDEX: 36.57 KG/M2 | WEIGHT: 270 LBS

## 2025-02-12 DIAGNOSIS — M19.012 PRIMARY OSTEOARTHRITIS, LEFT SHOULDER: ICD-10-CM

## 2025-02-12 DIAGNOSIS — M12.812 OTHER SPECIFIC ARTHROPATHIES, NOT ELSEWHERE CLASSIFIED, RIGHT SHOULDER: ICD-10-CM

## 2025-02-12 DIAGNOSIS — M25.561 PAIN IN RIGHT KNEE: ICD-10-CM

## 2025-02-12 DIAGNOSIS — M25.562 PAIN IN RIGHT KNEE: ICD-10-CM

## 2025-02-12 DIAGNOSIS — M19.011 PRIMARY OSTEOARTHRITIS, RIGHT SHOULDER: ICD-10-CM

## 2025-02-12 DIAGNOSIS — M12.811 OTHER SPECIFIC ARTHROPATHIES, NOT ELSEWHERE CLASSIFIED, RIGHT SHOULDER: ICD-10-CM

## 2025-02-12 DIAGNOSIS — G89.29 PAIN IN RIGHT SHOULDER: ICD-10-CM

## 2025-02-12 DIAGNOSIS — M17.0 BILATERAL PRIMARY OSTEOARTHRITIS OF KNEE: ICD-10-CM

## 2025-02-12 DIAGNOSIS — M25.511 PAIN IN RIGHT SHOULDER: ICD-10-CM

## 2025-02-12 DIAGNOSIS — M25.512 PAIN IN RIGHT SHOULDER: ICD-10-CM

## 2025-02-12 DIAGNOSIS — G89.29 PAIN IN RIGHT KNEE: ICD-10-CM

## 2025-02-12 PROCEDURE — 73010 X-RAY EXAM OF SHOULDER BLADE: CPT | Mod: 50

## 2025-02-12 PROCEDURE — 73564 X-RAY EXAM KNEE 4 OR MORE: CPT | Mod: 50

## 2025-02-12 PROCEDURE — 20610 DRAIN/INJ JOINT/BURSA W/O US: CPT | Mod: 50,59

## 2025-02-12 PROCEDURE — 99214 OFFICE O/P EST MOD 30 MIN: CPT | Mod: 25

## 2025-02-12 PROCEDURE — 73030 X-RAY EXAM OF SHOULDER: CPT | Mod: 50

## 2025-04-22 ENCOUNTER — APPOINTMENT (OUTPATIENT)
Facility: CLINIC | Age: 70
End: 2025-04-22
Payer: MEDICARE

## 2025-04-22 DIAGNOSIS — S69.90XA UNSPECIFIED INJURY OF UNSPECIFIED WRIST, HAND AND FINGER(S), INITIAL ENCOUNTER: ICD-10-CM

## 2025-04-22 PROCEDURE — 99213 OFFICE O/P EST LOW 20 MIN: CPT

## 2025-04-22 PROCEDURE — 99203 OFFICE O/P NEW LOW 30 MIN: CPT

## 2025-04-23 ENCOUNTER — RESULT REVIEW (OUTPATIENT)
Age: 70
End: 2025-04-23

## 2025-04-29 ENCOUNTER — APPOINTMENT (OUTPATIENT)
Facility: CLINIC | Age: 70
End: 2025-04-29
Payer: MEDICARE

## 2025-04-29 DIAGNOSIS — M65.30 TRIGGER FINGER, UNSPECIFIED FINGER: ICD-10-CM

## 2025-04-29 PROCEDURE — 99213 OFFICE O/P EST LOW 20 MIN: CPT

## 2025-08-20 ENCOUNTER — APPOINTMENT (OUTPATIENT)
Dept: ORTHOPEDIC SURGERY | Facility: CLINIC | Age: 70
End: 2025-08-20
Payer: MEDICARE

## 2025-08-20 DIAGNOSIS — M12.812 OTHER SPECIFIC ARTHROPATHIES, NOT ELSEWHERE CLASSIFIED, RIGHT SHOULDER: ICD-10-CM

## 2025-08-20 DIAGNOSIS — M19.012 PRIMARY OSTEOARTHRITIS, LEFT SHOULDER: ICD-10-CM

## 2025-08-20 DIAGNOSIS — M19.011 PRIMARY OSTEOARTHRITIS, RIGHT SHOULDER: ICD-10-CM

## 2025-08-20 DIAGNOSIS — M17.0 BILATERAL PRIMARY OSTEOARTHRITIS OF KNEE: ICD-10-CM

## 2025-08-20 DIAGNOSIS — M12.811 OTHER SPECIFIC ARTHROPATHIES, NOT ELSEWHERE CLASSIFIED, RIGHT SHOULDER: ICD-10-CM

## 2025-08-20 PROCEDURE — 20610 DRAIN/INJ JOINT/BURSA W/O US: CPT | Mod: 50

## 2025-08-20 PROCEDURE — 99213 OFFICE O/P EST LOW 20 MIN: CPT | Mod: 25
